# Patient Record
Sex: MALE | Race: BLACK OR AFRICAN AMERICAN
[De-identification: names, ages, dates, MRNs, and addresses within clinical notes are randomized per-mention and may not be internally consistent; named-entity substitution may affect disease eponyms.]

---

## 2017-10-12 ENCOUNTER — HOSPITAL ENCOUNTER (INPATIENT)
Dept: HOSPITAL 74 - YASAS | Age: 52
LOS: 5 days | Discharge: HOME | DRG: 773 | End: 2017-10-17
Attending: INTERNAL MEDICINE | Admitting: INTERNAL MEDICINE
Payer: COMMERCIAL

## 2017-10-12 VITALS — BODY MASS INDEX: 28.3 KG/M2

## 2017-10-12 DIAGNOSIS — M21.372: ICD-10-CM

## 2017-10-12 DIAGNOSIS — R26.89: ICD-10-CM

## 2017-10-12 DIAGNOSIS — K21.9: ICD-10-CM

## 2017-10-12 DIAGNOSIS — F11.23: Primary | ICD-10-CM

## 2017-10-12 DIAGNOSIS — Z99.89: ICD-10-CM

## 2017-10-12 DIAGNOSIS — I10: ICD-10-CM

## 2017-10-12 DIAGNOSIS — F17.213: ICD-10-CM

## 2017-10-12 PROCEDURE — HZ2ZZZZ DETOXIFICATION SERVICES FOR SUBSTANCE ABUSE TREATMENT: ICD-10-PCS | Performed by: INTERNAL MEDICINE

## 2017-10-12 NOTE — HP
COWS





- Scale


Resting Pulse: 0= [place] 80 or Below


Sweatin= Chills/Flushing


Restless Observation: 1= Difficult to Sit Still


Pupil Size: 0= Normal to Room Light


Bone or Joint Aches: 2= Severe Diffuse Aches


Runny Nose/ Eye Tearin= Runny Nose/Eyes


GI Upset > 30mins: 2= Nausea/Diarrhea


Tremor Observation: 2= Slight Tremor Visible


Yawning Observation: 0= None


Anxiety or Irritability: 1=Feels Anxious/Irritable


Goose Flesh Skin: 3=Piloerection


COWS Score: 14





Admission [facility]





- hospitals


Chief Complaint: 


WITHDRAWAL SX


Allergies/Adverse Reactions: 


 Allergies











Allergy/AdvReac Type Severity Reaction Status Date / Time


 


No Known Allergies Allergy   Verified 10/12/17 20:23











History of Present Illness: 


52 YEARS OLD MALE WITH LONG HISTORY OF OPIATE NICOTINE DEPENDENCE HAS 

HYPERTENSION AND LEFT DROP FOOT SINCE  GREAT TOE + 2ND TOE AMPUTEE, DENIES 

MENTAL ILLNESS IS ADMITTED TO DETOX


Exam Limitations: No Limitations





- Ebola screening


Have you traveled outside of the country in the last 21 days: No


Have you had contact with anyone from an Ebola affected area: No


Have you been sick,other than usual withdrawal symptoms: No


Do you have a fever: No





- Review of Systems


Constitutional: Changes in sleep, Weight Stable


EENT: reports: No Symptoms Reported


Respiratory: reports: No Symptoms reported


Cardiac: reports: No Symptoms Reported


GI: reports: Nausea, Poor Fluid Intake, Indigestion, Abdominal cramping


: reports: No Symptoms Reported


Musculoskeletal: reports: Back Pain, Joint Pain, Muscle Pain, Muscle Weakness (

LEFT FOOT), Neck Pain


Integumentary: reports: No Symptoms Reported


Neuro: reports: Tremors


Endocrine: reports: No Symptoms Reported


Hematology: reports: No Symptoms Reported


Psychiatric: reports: Judgement Intact, Mood/Affect Appropiate, Orientated x3


Other Systems: Reviewed and Negative





Patient History





- Patient Medical History


Hx Anemia: No


Hx Asthma: No


Hx Chronic Obstructive Pulmonary Disease (COPD): No


Hx Cancer: No


Hx Cardiac Disorders: No


Hx Congestive Heart Failure: No


Hx Hypertension: No


Hx Hypercholesterolemia: No


Hx Pacemaker: No


HX Cerebrovascular Accident: No


Hx Seizures: No


Hx Dementia: No


Hx Diabetes: No


Hx Gastrointestinal Disorders: No


Hx Liver Disease: No


Hx Genitourinary Disorders: No


Hx Sexually Transmitted Disorders: No


Hx Renal Disease (ESRD): No


Hx Thyroid Disease: No


Hx Human Immunodeficiency Virus (HIV): No


Hx Hepatitis C: No


Hx Depression: No


Hx Suicide Attempt: No


Hx Bipolar Disorder: No


Hx Schizophrenia: No





- Patient Surgical History


Past Surgical History: Yes


Hx Neurologic Surgery: No


Hx Cataract Extraction: No


Hx Cardiac Surgery: No


Hx Lung Surgery: No


Hx Breast Surgery: No


Hx Breast Biopsy: No


Hx Abdominal Surgery: No


Hx Appendectomy: No


Hx Cholecystectomy: No


Hx Genitourinary Surgery: No


Hx Orthopedic Surgery: Yes ( LEFT LEG)


Anesthesia Reaction: No





- PPD History


Previous Implant?: Yes


Documented Results: Negative w/proof


Implanted On Prior [facility] Admission?: No


PPD to be Administered?: Yes





- Smoking Cessation


Smoking history: Current every day smoker


Have you smoked in the past 12 months: Yes


Aproximately how many cigarettes per day: 10


Cigars Per Day: 0


Hx Chewing Tobacco Use: No


Initiated information on smoking cessation: Yes


'Breaking Loose' booklet given: 10/12/17





- Substance & Tx. History


Hx Alcohol Use: No


Hx Substance Use: Yes


Substance Use Type: Cocaine, Heroin


Hx Substance Use Treatment: Yes (2017 [facility])





- Substances Abused


  ** Heroin


Route: Oral


Frequency: Daily


Amount used: 25 BAGS


Age of first use: 26


Date of Last Use: 10/12/17





Family Disease History





- Family Disease History


Family Disease History: CA: Father (), Other: Father





Admission Physical Exam [facility]





- Vital Signs


Vital Signs: 


 Vital Signs - 24 hr











  10/12/17





  17:40


 


Temperature 98.8 F


 


Pulse Rate 64


 


Respiratory 18





Rate 


 


Blood Pressure 150/85














- Physical


General Appearance: Yes: Nourished, Appropriately Dressed, Mild Distress, 

Tremorous, Irritable, Sweating, Anxious


HEENTM: Yes: Hearing grossly Normal, Normal ENT Inspection, Normocephalic, 

Normal Voice


Respiratory: Yes: Chest Non-Tender, Lungs Clear, Normal Breath Sounds, No 

Respiratory Distress, No Accessory Muscle Use


Neck: Yes: Supple, Trachea in good position


Breast: Yes: Breasts Symetrical


Cardiology: Yes: Regular Rhythm, Regular Rate, S1, S2


Abdominal: Yes: Non Tender, Soft, Decreased BS


Genitourinary: Yes: Within Normal Limits


Back: Yes: Normal Inspection


Musculoskeletal: Yes: Gait Steady (CANE), Muscle Pain (LEFT LEG)


Extremities: Yes: Non-Tender, Tremors, Other (LET FOOT GREAT + 2ND TOE AMPUTEE)


Neurological: Yes: Fully Oriented, Alert, Normal Mood/Affect, Normal Response


Integumentary: Yes: Warm


Lymphatic: Yes: Within Normal Limits





- Diagnostic


(1) Opioid dependence with withdrawal


Current Visit: Yes   Status: Acute





(2) Hypertension


Current Visit: Yes   Status: Chronic   Qualifiers: 


     Hypertension type: essential hypertension        Qualified Code(s): I10 - 

Essential (primary) hypertension; I10 - Essential (primary) hypertension; I10 - 

Essential (primary) hypertension  





(3) Use of cane as ambulatory aid


Current Visit: Yes   Status: Chronic





(4) GERD (gastroesophageal reflux disease)


Current Visit: Yes   Status: Chronic   Qualifiers: 


     Esophagitis presence: without esophagitis        Qualified Code(s): K21.9 

- Gastro-esophageal reflux disease without esophagitis; K21.9 - Gastro-

esophageal reflux disease without esophagitis; K21.9 - Gastro-esophageal reflux 

disease without esophagitis  





(5) Nicotine dependence


Current Visit: Yes   Status: Acute   Qualifiers: 


     Nicotine product type: cigarettes     Substance use status: in withdrawal 

       Qualified Code(s): F17.213 - Nicotine dependence, cigarettes, with 

withdrawal; F17.213 - Nicotine dependence, cigarettes, with withdrawal  





(6) Left foot drop


Current Visit: Yes   Status: Chronic








Cleared for Admission S





- Detox or Rehab


[facility] Level of Care: Medically Managed


Detox Regimen/Protocol: Methadone





[facility] Breath Alcohol Content


Breath Alcohol Content: 0





Urine Drug Screen





- Results


Drug Screen Negative: No


Urine Drug Screen Results: NICOLE-Cocaine, OPI-Opiates, MTD-Methadone

## 2017-10-13 LAB
ALBUMIN SERPL-MCNC: 3 G/DL (ref 3.4–5)
ALP SERPL-CCNC: 342 U/L (ref 45–117)
ALT SERPL-CCNC: 211 U/L (ref 12–78)
ANION GAP SERPL CALC-SCNC: 8 MMOL/L (ref 8–16)
APPEARANCE UR: (no result)
AST SERPL-CCNC: 89 U/L (ref 15–37)
BILIRUB SERPL-MCNC: 1.5 MG/DL (ref 0.2–1)
BILIRUB UR STRIP.AUTO-MCNC: 4 MG/DL
CALCIUM SERPL-MCNC: 8.4 MG/DL (ref 8.5–10.1)
CO2 SERPL-SCNC: 27 MMOL/L (ref 21–32)
COLOR UR: (no result)
CREAT SERPL-MCNC: 1 MG/DL (ref 0.7–1.3)
DEPRECATED RDW RBC AUTO: 14.8 % (ref 11.9–15.9)
GLUCOSE SERPL-MCNC: 83 MG/DL (ref 74–106)
KETONES UR QL STRIP: NEGATIVE
MCH RBC QN AUTO: 25.6 PG (ref 25.7–33.7)
MCHC RBC AUTO-ENTMCNC: 31.7 G/DL (ref 32–35.9)
MCV RBC: 80.9 FL (ref 80–96)
MUCOUS THREADS URNS QL MICRO: (no result)
NITRITE UR QL STRIP: NEGATIVE
PH UR: 5 [PH] (ref 5–8)
PLATELET # BLD AUTO: 295 K/MM3 (ref 134–434)
PMV BLD: 7.6 FL (ref 7.5–11.1)
PROT SERPL-MCNC: 6.4 G/DL (ref 6.4–8.2)
PROT UR QL STRIP: (no result)
PROT UR QL STRIP: NEGATIVE
RBC # BLD AUTO: 6 /HPF (ref 0–3)
RBC # UR STRIP: (no result) /UL
SP GR UR: >= 1.03 (ref 1–1.02)
UROBILINOGEN UR STRIP-MCNC: (no result) MG/DL (ref 0.2–1)
WBC # BLD AUTO: 8.4 K/MM3 (ref 4–10)

## 2017-10-13 RX ADMIN — AMLODIPINE BESYLATE SCH MG: 10 TABLET ORAL at 11:04

## 2017-10-13 RX ADMIN — RANITIDINE SCH MG: 150 TABLET ORAL at 11:04

## 2017-10-13 RX ADMIN — NICOTINE SCH: 14 PATCH, EXTENDED RELEASE TRANSDERMAL at 11:05

## 2017-10-13 RX ADMIN — HYDROCHLOROTHIAZIDE SCH MG: 25 TABLET ORAL at 11:06

## 2017-10-13 RX ADMIN — RANITIDINE SCH MG: 150 TABLET ORAL at 22:19

## 2017-10-13 RX ADMIN — Medication SCH MG: at 22:19

## 2017-10-13 RX ADMIN — Medication SCH TAB: at 11:04

## 2017-10-13 NOTE — EKG
Test Reason : 

Blood Pressure : ***/*** mmHG

Vent. Rate : 061 BPM     Atrial Rate : 061 BPM

   P-R Int : 164 ms          QRS Dur : 090 ms

    QT Int : 408 ms       P-R-T Axes : 047 043 -10 degrees

   QTc Int : 410 ms

 

NORMAL SINUS RHYTHM

NONSPECIFIC ST ABNORMALITY

NO PREVIOUS ECGS AVAILABLE

Confirmed by MATT NAVARRETE MD ([id number]) on 10/13/2017 9:25:19 AM

 

Referred By: MADIE SEXTON           Confirmed By:MATT NAVARRETE MD

## 2017-10-13 NOTE — PN
[facility] COWS





- Scale


Resting Pulse: 0= [place] 80 or Below


Sweatin= Chills/Flushing


Restless Observation: 3= Extraneous Movement


Pupil Size: 1= Pupils >than Normal


Bone or Joint Aches: 2= Severe Diffuse Aches


Runny Nose/ Eye Tearin= Runny Nose/Eyes


GI Upset > 30mins: 3= Vomiting/Diarrhea


Tremor Observation of Outstretched Hands: 2= Slight Tremor Visible


Yawning Observation: 1= 1-2x During Session


Anxiety or Irritability: 2=Irritable/Anxious


Goose Flesh Skin: 0=Smooth Skin


COWS Score: 17





[facility] Progress Note (SOAP)


Subjective: 


alert,irritable,anxious,interrupted sleep,tremor,pain in the body,back


Objective: 





10/13/17 11:49


 Vital Signs











Temperature  98.1 F   10/13/17 09:45


 


Pulse Rate  72   10/13/17 09:45


 


Respiratory Rate  18   10/13/17 09:45


 


Blood Pressure  157/103   10/13/17 09:45


 


O2 Sat by Pulse Oximetry (%)      








ekg nsr,normal ecg


 Laboratory Last Values











WBC  8.4 K/mm3 (4.0-10.0)   10/13/17  07:00    


 


RBC  4.96 M/mm3 (4.00-5.60)   10/13/17  07:00    


 


Hgb  12.7 GM/dL (11.7-16.9)   10/13/17  07:00    


 


Hct  40.1 % (35.4-49)   10/13/17  07:00    


 


MCV  80.9 fl (80-96)   10/13/17  07:00    


 


MCH  25.6 pg (25.7-33.7)  L  10/13/17  07:00    


 


MCHC  31.7 g/dl (32.0-35.9)  L  10/13/17  07:00    


 


RDW  14.8 % (11.9-15.9)   10/13/17  07:00    


 


Plt Count  295 K/MM3 (134-434)   10/13/17  07:00    


 


MPV  7.6 fl (7.5-11.1)   10/13/17  07:00    


 


Sodium  143 mmol/L (136-145)   10/13/17  07:00    


 


Potassium  3.8 mmol/L (3.5-5.1)   10/13/17  07:00    


 


Chloride  108 mmol/L ()  H  10/13/17  07:00    


 


Carbon Dioxide  27 mmol/L (21-32)   10/13/17  07:00    


 


Anion Gap  8  (8-16)   10/13/17  07:00    


 


BUN  12 mg/dL (7-18)   10/13/17  07:00    


 


Creatinine  1.0 mg/dL (0.7-1.3)   10/13/17  07:00    


 


Creat Clearance w eGFR  > 60  (>60)   10/13/17  07:00    


 


Random Glucose  83 mg/dL ()   10/13/17  07:00    


 


Calcium  8.4 mg/dL (8.5-10.1)  L  10/13/17  07:00    


 


Total Bilirubin  1.5 mg/dL (0.2-1.0)  H  10/13/17  07:00    


 


AST  89 U/L (15-37)  H  10/13/17  07:00    


 


ALT  211 U/L (12-78)  H  10/13/17  07:00    


 


Alkaline Phosphatase  342 U/L ()  H  10/13/17  07:00    


 


Total Protein  6.4 g/dl (6.4-8.2)   10/13/17  07:00    


 


Albumin  3.0 g/dl (3.4-5.0)  L  10/13/17  07:00    


 


Urine Color  Missy   10/12/17  22:20    


 


Urine Appearance  Slcloudy   10/12/17  22:20    


 


Urine pH  5.0  (5.0-8.0)   10/12/17  22:20    


 


Ur Specific Gravity  >= 1.030  (1.005-1.025)  H  10/12/17  22:20    


 


Urine Protein  1+  (NEGATIVE)  H  10/12/17  22:20    


 


Urine Glucose (UA)  Negative  (NEGATIVE)   10/12/17  22:20    


 


Urine Ketones  Negative  (NEGATIVE)   10/12/17  22:20    


 


Urine Blood  1+  (NEGATIVE)  H  10/12/17  22:20    


 


Urine Nitrite  Negative  (NEGATIVE)   10/12/17  22:20    


 


Urine Bilirubin  4.0  (NEGATIVE)   10/12/17  22:20    


 


Urine Urobilinogen  4.0 e.u/dl mg/dL (0.2-1.0)   10/12/17  22:20    


 


Ur Leukocyte Esterase  Negative  (NEGATIVE)   10/12/17  22:20    


 


Urine RBC  6 /hpf (0-3)   10/12/17  22:20    


 


Ur Epithelial Cells  Rare /hpf (FEW)   10/12/17  22:20    


 


Urine Mucus  Many   10/12/17  22:20    











Assessment: 





10/13/17 11:50


withdrawal symptom


Plan: 


continue detox,d/c tylenol for elevation of ast,alt,alk phosphatase repeat amp,

inr in am

## 2017-10-14 LAB
ALBUMIN SERPL-MCNC: 3 G/DL (ref 3.4–5)
ALP SERPL-CCNC: 302 U/L (ref 45–117)
ALT SERPL-CCNC: 167 U/L (ref 12–78)
ANION GAP SERPL CALC-SCNC: 6 MMOL/L (ref 8–16)
AST SERPL-CCNC: 50 U/L (ref 15–37)
BILIRUB SERPL-MCNC: 1.2 MG/DL (ref 0.2–1)
CALCIUM SERPL-MCNC: 8.8 MG/DL (ref 8.5–10.1)
CO2 SERPL-SCNC: 32 MMOL/L (ref 21–32)
CREAT SERPL-MCNC: 0.9 MG/DL (ref 0.7–1.3)
GLUCOSE SERPL-MCNC: 84 MG/DL (ref 74–106)
INR BLD: 0.96 (ref 0.82–1.09)
PROT SERPL-MCNC: 6.5 G/DL (ref 6.4–8.2)
PT PNL PPP: 10.8 SEC (ref 9.98–11.88)

## 2017-10-14 RX ADMIN — HYDROCHLOROTHIAZIDE SCH MG: 25 TABLET ORAL at 10:52

## 2017-10-14 RX ADMIN — RANITIDINE SCH MG: 150 TABLET ORAL at 22:47

## 2017-10-14 RX ADMIN — RANITIDINE SCH MG: 150 TABLET ORAL at 10:52

## 2017-10-14 RX ADMIN — Medication SCH TAB: at 10:51

## 2017-10-14 RX ADMIN — AMLODIPINE BESYLATE SCH MG: 10 TABLET ORAL at 10:52

## 2017-10-14 RX ADMIN — Medication SCH MG: at 22:47

## 2017-10-14 RX ADMIN — NICOTINE SCH MG: 14 PATCH, EXTENDED RELEASE TRANSDERMAL at 10:52

## 2017-10-14 NOTE — PN
[facility] COWS





- Scale


Resting Pulse: 0= [place] 80 or Below


Sweatin= Chills/Flushing


Restless Observation: 3= Extraneous Movement


Pupil Size: 1= Pupils >than Normal


Bone or Joint Aches: 2= Severe Diffuse Aches


Runny Nose/ Eye Tearin= Runny Nose/Eyes


GI Upset > 30mins: 2= Nausea/Diarrhea


Tremor Observation of Outstretched Hands: 2= Slight Tremor Visible


Yawning Observation: 1= 1-2x During Session


Anxiety or Irritability: 2=Irritable/Anxious


Goose Flesh Skin: 0=Smooth Skin


COWS Score: 16





[facility] Progress Note (SOAP)


Subjective: 


alert,irritable,anxious,interrupted sleep,pain in the body and back


Objective: 





10/14/17 14:14


 Vital Signs











Temperature  98.6 F   10/14/17 13:43


 


Pulse Rate  90   10/14/17 13:43


 


Respiratory Rate  20   10/14/17 13:43


 


Blood Pressure  106/87   10/14/17 13:43


 


O2 Sat by Pulse Oximetry (%)      








 Laboratory Last Values











WBC  8.4 K/mm3 (4.0-10.0)   10/13/17  07:00    


 


RBC  4.96 M/mm3 (4.00-5.60)   10/13/17  07:00    


 


Hgb  12.7 GM/dL (11.7-16.9)   10/13/17  07:00    


 


Hct  40.1 % (35.4-49)   10/13/17  07:00    


 


MCV  80.9 fl (80-96)   10/13/17  07:00    


 


MCH  25.6 pg (25.7-33.7)  L  10/13/17  07:00    


 


MCHC  31.7 g/dl (32.0-35.9)  L  10/13/17  07:00    


 


RDW  14.8 % (11.9-15.9)   10/13/17  07:00    


 


Plt Count  295 K/MM3 (134-434)   10/13/17  07:00    


 


MPV  7.6 fl (7.5-11.1)   10/13/17  07:00    


 


PT with INR  10.80 SEC (9.98-11.88)   10/14/17  08:30    


 


INR  0.96  (0.82-1.09)   10/14/17  08:30    


 


Sodium  143 mmol/L (136-145)   10/14/17  08:00    


 


Potassium  3.7 mmol/L (3.5-5.1)   10/14/17  08:00    


 


Chloride  105 mmol/L ()   10/14/17  08:00    


 


Carbon Dioxide  32 mmol/L (21-32)   10/14/17  08:00    


 


Anion Gap  6  (8-16)  L  10/14/17  08:00    


 


BUN  17 mg/dL (7-18)  D 10/14/17  08:00    


 


Creatinine  0.9 mg/dL (0.7-1.3)   10/14/17  08:00    


 


Creat Clearance w eGFR  > 60  (>60)   10/14/17  08:00    


 


Random Glucose  84 mg/dL ()   10/14/17  08:00    


 


Calcium  8.8 mg/dL (8.5-10.1)   10/14/17  08:00    


 


Total Bilirubin  1.2 mg/dL (0.2-1.0)  H  10/14/17  08:00    


 


AST  50 U/L (15-37)  H D 10/14/17  08:00    


 


ALT  167 U/L (12-78)  H D 10/14/17  08:00    


 


Alkaline Phosphatase  302 U/L ()  H  10/14/17  08:00    


 


Total Protein  6.5 g/dl (6.4-8.2)   10/14/17  08:00    


 


Albumin  3.0 g/dl (3.4-5.0)  L  10/14/17  08:00    


 


Urine Color  Missy   10/12/17  22:20    


 


Urine Appearance  Slcloudy   10/12/17  22:20    


 


Urine pH  5.0  (5.0-8.0)   10/12/17  22:20    


 


Ur Specific Gravity  >= 1.030  (1.005-1.025)  H  10/12/17  22:20    


 


Urine Protein  1+  (NEGATIVE)  H  10/12/17  22:20    


 


Urine Glucose (UA)  Negative  (NEGATIVE)   10/12/17  22:20    


 


Urine Ketones  Negative  (NEGATIVE)   10/12/17  22:20    


 


Urine Blood  1+  (NEGATIVE)  H  10/12/17  22:20    


 


Urine Nitrite  Negative  (NEGATIVE)   10/12/17  22:20    


 


Urine Bilirubin  4.0  (NEGATIVE)   10/12/17  22:20    


 


Urine Urobilinogen  4.0 e.u/dl mg/dL (0.2-1.0)   10/12/17  22:20    


 


Ur Leukocyte Esterase  Negative  (NEGATIVE)   10/12/17  22:20    


 


Urine RBC  6 /hpf (0-3)   10/12/17  22:20    


 


Ur Epithelial Cells  Rare /hpf (FEW)   10/12/17  22:20    


 


Urine Mucus  Many   10/12/17  22:20    


 


RPR Titer  Nonreactive  (NONREACTIVE)   10/13/17  07:00    











Assessment: 





10/14/17 14:15


withdrawal symptom


Plan: 


continue detox

## 2017-10-15 RX ADMIN — Medication SCH MG: at 22:54

## 2017-10-15 RX ADMIN — Medication SCH TAB: at 11:13

## 2017-10-15 RX ADMIN — RANITIDINE SCH MG: 150 TABLET ORAL at 11:13

## 2017-10-15 RX ADMIN — RANITIDINE SCH MG: 150 TABLET ORAL at 22:54

## 2017-10-15 RX ADMIN — NICOTINE SCH: 14 PATCH, EXTENDED RELEASE TRANSDERMAL at 11:14

## 2017-10-15 RX ADMIN — Medication SCH APPLIC: at 22:56

## 2017-10-15 RX ADMIN — HYDROCHLOROTHIAZIDE SCH MG: 25 TABLET ORAL at 11:13

## 2017-10-15 RX ADMIN — AMLODIPINE BESYLATE SCH MG: 10 TABLET ORAL at 11:13

## 2017-10-15 NOTE — PN
[facility] Progress Note (SOAP)


Subjective: 


ALERT,IRRITABLE,ANXIOUS,INTERRUPTED SLEEP,PAIN IN THE BODY,


SENSITIVE SKIN REQUESTING AVEENO SOAP


Objective: 





10/15/17 13:42


 Vital Signs











Temperature  97.3 F L  10/15/17 06:55


 


Pulse Rate  86   10/15/17 06:55


 


Respiratory Rate  18   10/15/17 06:55


 


Blood Pressure  147/87   10/15/17 06:55


 


O2 Sat by Pulse Oximetry (%)      











Assessment: 





10/15/17 13:42


WITHDRAWAL SYMPTOM


Plan: 


CONTINUE DETOX

## 2017-10-16 RX ADMIN — NICOTINE SCH: 14 PATCH, EXTENDED RELEASE TRANSDERMAL at 10:52

## 2017-10-16 RX ADMIN — Medication SCH APPLIC: at 10:52

## 2017-10-16 RX ADMIN — Medication SCH MG: at 22:08

## 2017-10-16 RX ADMIN — RANITIDINE SCH MG: 150 TABLET ORAL at 10:49

## 2017-10-16 RX ADMIN — Medication SCH TAB: at 10:49

## 2017-10-16 RX ADMIN — HYDROCHLOROTHIAZIDE SCH MG: 25 TABLET ORAL at 10:49

## 2017-10-16 RX ADMIN — Medication SCH: at 22:28

## 2017-10-16 RX ADMIN — AMLODIPINE BESYLATE SCH MG: 10 TABLET ORAL at 10:49

## 2017-10-16 RX ADMIN — RANITIDINE SCH MG: 150 TABLET ORAL at 22:08

## 2017-10-16 NOTE — PN
[facility] Progress Note (SOAP)


Subjective: 


alert,irritable,anxious,interrupted sleep


Objective: 





10/16/17 12:32


 Vital Signs











Temperature  98.2 F   10/16/17 11:10


 


Pulse Rate  86   10/16/17 11:10


 


Respiratory Rate  18   10/16/17 11:10


 


Blood Pressure  144/90   10/16/17 11:10


 


O2 Sat by Pulse Oximetry (%)      











Assessment: 





10/16/17 12:32


withdrawal symptom


Plan: 


continue detox,discharge in am

## 2017-10-17 VITALS — SYSTOLIC BLOOD PRESSURE: 105 MMHG | TEMPERATURE: 97 F | DIASTOLIC BLOOD PRESSURE: 71 MMHG | HEART RATE: 86 BPM

## 2017-10-17 LAB — WBC # UR AUTO: 6 /HPF (ref 3–5)

## 2017-10-17 RX ADMIN — HYDROCHLOROTHIAZIDE SCH MG: 25 TABLET ORAL at 10:29

## 2017-10-17 RX ADMIN — Medication SCH TAB: at 10:29

## 2017-10-17 RX ADMIN — RANITIDINE SCH MG: 150 TABLET ORAL at 10:29

## 2017-10-17 RX ADMIN — AMLODIPINE BESYLATE SCH MG: 10 TABLET ORAL at 10:29

## 2017-10-17 RX ADMIN — NICOTINE SCH: 14 PATCH, EXTENDED RELEASE TRANSDERMAL at 10:29

## 2017-10-17 NOTE — DS
[facility] Detox Discharge Summary


Admission Date: 


10/12/17





Discharge Date: 10/17/17





- History


Present History: Opioid Dependence


Additional Comments: 


follow up with after care program as arrangement


Pertinent Past History: 


hypertension


gerd


nicotine dependence


left foot drop


use cane as ambulatory aid





- Physical Exam Results


Vital Signs: 


 Vital Signs











Temperature  96.7 F L  10/17/17 06:45


 


Pulse Rate  74   10/17/17 06:45


 


Respiratory Rate  16   10/17/17 06:45


 


Blood Pressure  104/76   10/17/17 06:45


 


O2 Sat by Pulse Oximetry (%)      











Pertinent Admission Physical Exam Findings: 


withdrawal symptom





- Treatment


Hospital Course: Detox Protocol Followed, Detoxed Safely, Responded well, 

Discharged Condition Good


Patient has Accepted a Rehab Referral to: declined





- Medication


Discharge Medications: 


Ambulatory Orders





Amlodipine Besylate [Norvasc -] 5 mg PO DAILY 10/12/17 


Hydrochlorothiazide [Hctz -] 12.5 mg PO DAILY 10/12/17 











- Diagnosis


(1) Opioid dependence with withdrawal


Current Visit: Yes   Status: Acute





(2) Nicotine dependence


Current Visit: Yes   Status: Acute   Qualifiers: 


     Nicotine product type: cigarettes     Substance use status: in withdrawal 

       Qualified Code(s): F17.213 - Nicotine dependence, cigarettes, with 

withdrawal; F17.213 - Nicotine dependence, cigarettes, with withdrawal  





(3) Hypertension


Current Visit: Yes   Status: Chronic   Qualifiers: 


     Hypertension type: essential hypertension        Qualified Code(s): I10 - 

Essential (primary) hypertension; I10 - Essential (primary) hypertension; I10 - 

Essential (primary) hypertension  





(4) Left foot drop


Current Visit: Yes   Status: Chronic





(5) Use of cane as ambulatory aid


Current Visit: Yes   Status: Chronic





(6) GERD (gastroesophageal reflux disease)


Current Visit: Yes   Status: Chronic   Qualifiers: 


     Esophagitis presence: without esophagitis        Qualified Code(s): K21.9 

- Gastro-esophageal reflux disease without esophagitis; K21.9 - Gastro-

esophageal reflux disease without esophagitis; K21.9 - Gastro-esophageal reflux 

disease without esophagitis  








- AMA


Did Patient Leave Against Medical Advice: No

## 2018-03-27 ENCOUNTER — HOSPITAL ENCOUNTER (INPATIENT)
Dept: HOSPITAL 74 - YASAS | Age: 53
LOS: 4 days | Discharge: HOME | DRG: 773 | End: 2018-03-31
Attending: INTERNAL MEDICINE | Admitting: INTERNAL MEDICINE
Payer: COMMERCIAL

## 2018-03-27 VITALS — BODY MASS INDEX: 27.7 KG/M2

## 2018-03-27 DIAGNOSIS — M21.372: ICD-10-CM

## 2018-03-27 DIAGNOSIS — F17.213: ICD-10-CM

## 2018-03-27 DIAGNOSIS — E87.6: ICD-10-CM

## 2018-03-27 DIAGNOSIS — K21.9: ICD-10-CM

## 2018-03-27 DIAGNOSIS — F11.23: Primary | ICD-10-CM

## 2018-03-27 DIAGNOSIS — I10: ICD-10-CM

## 2018-03-27 LAB
APPEARANCE UR: (no result)
BILIRUB UR STRIP.AUTO-MCNC: NEGATIVE MG/DL
COLOR UR: YELLOW
KETONES UR QL STRIP: NEGATIVE
LEUKOCYTE ESTERASE UR QL STRIP.AUTO: NEGATIVE
MUCOUS THREADS URNS QL MICRO: (no result)
NITRITE UR QL STRIP: NEGATIVE
PH UR: 5 [PH] (ref 5–8)
PROT UR QL STRIP: (no result)
PROT UR QL STRIP: NEGATIVE
RBC # UR STRIP: NEGATIVE /UL
SP GR UR: 1.03 (ref 1–1.03)
UROBILINOGEN UR STRIP-MCNC: (no result) MG/DL (ref 0.2–1)
YEAST #/AREA URNS HPF: (no result) /[HPF]

## 2018-03-27 PROCEDURE — HZ2ZZZZ DETOXIFICATION SERVICES FOR SUBSTANCE ABUSE TREATMENT: ICD-10-PCS | Performed by: SURGERY

## 2018-03-27 RX ADMIN — Medication SCH MG: at 22:36

## 2018-03-28 LAB
ALBUMIN SERPL-MCNC: 3.4 G/DL (ref 3.4–5)
ALP SERPL-CCNC: 161 U/L (ref 45–117)
ALT SERPL-CCNC: 22 U/L (ref 12–78)
ANION GAP SERPL CALC-SCNC: 11 MMOL/L (ref 8–16)
APPEARANCE UR: (no result)
AST SERPL-CCNC: 13 U/L (ref 15–37)
BACTERIA #/AREA URNS HPF: (no result) /HPF
BILIRUB SERPL-MCNC: 0.4 MG/DL (ref 0.2–1)
BILIRUB UR STRIP.AUTO-MCNC: 2 MG/DL
BUN SERPL-MCNC: 16 MG/DL (ref 7–18)
CALCIUM SERPL-MCNC: 8.5 MG/DL (ref 8.5–10.1)
CHLORIDE SERPL-SCNC: 108 MMOL/L (ref 98–107)
CO2 SERPL-SCNC: 22 MMOL/L (ref 21–32)
COLOR UR: (no result)
CREAT SERPL-MCNC: 1.1 MG/DL (ref 0.7–1.3)
DEPRECATED RDW RBC AUTO: 14 % (ref 11.9–15.9)
EPITH CASTS URNS QL MICRO: (no result) /HPF
GLUCOSE SERPL-MCNC: 98 MG/DL (ref 74–106)
HCT VFR BLD CALC: 41.6 % (ref 35.4–49)
HGB BLD-MCNC: 13.3 GM/DL (ref 11.7–16.9)
KETONES UR QL STRIP: (no result)
LEUKOCYTE ESTERASE UR QL STRIP.AUTO: NEGATIVE
MCH RBC QN AUTO: 26 PG (ref 25.7–33.7)
MCHC RBC AUTO-ENTMCNC: 31.9 G/DL (ref 32–35.9)
MCV RBC: 81.3 FL (ref 80–96)
MUCOUS THREADS URNS QL MICRO: (no result)
NITRITE UR QL STRIP: NEGATIVE
PH UR: 5 [PH] (ref 5–8)
PLATELET # BLD AUTO: 348 K/MM3 (ref 134–434)
PMV BLD: 7.6 FL (ref 7.5–11.1)
POTASSIUM SERPLBLD-SCNC: 3.4 MMOL/L (ref 3.5–5.1)
PROT SERPL-MCNC: 6.9 G/DL (ref 6.4–8.2)
PROT UR QL STRIP: (no result)
PROT UR QL STRIP: NEGATIVE
RBC # BLD AUTO: 5.12 M/MM3 (ref 4–5.6)
RBC # UR STRIP: NEGATIVE /UL
SODIUM SERPL-SCNC: 141 MMOL/L (ref 136–145)
SP GR UR: 1.04 (ref 1–1.03)
UROBILINOGEN UR STRIP-MCNC: 2 MG/DL (ref 0.2–1)
WBC # BLD AUTO: 9.8 K/MM3 (ref 4–10)

## 2018-03-28 RX ADMIN — Medication SCH TAB: at 11:05

## 2018-03-28 RX ADMIN — AMLODIPINE BESYLATE SCH MG: 5 TABLET ORAL at 12:33

## 2018-03-28 RX ADMIN — Medication SCH MG: at 22:10

## 2018-03-28 RX ADMIN — HYDROCHLOROTHIAZIDE SCH MG: 12.5 CAPSULE ORAL at 12:32

## 2018-03-28 NOTE — PN
[facility] COWS





- Scale


Resting Pulse: 1= [place] 


Sweatin= Chills/Flushing


Restless Observation: 3= Extraneous Movement


Pupil Size: 2= Moderately Dilated


Bone or Joint Aches: 2= Severe Diffuse Aches


Runny Nose/ Eye Tearin= Runny Nose/Eyes


GI Upset > 30mins: 2= Nausea/Diarrhea


Tremor Observation of Outstretched Hands: 2= Slight Tremor Visible


Yawning Observation: 1= 1-2x During Session


Anxiety or Irritability: 2=Irritable/Anxious


Goose Flesh Skin: 0=Smooth Skin


COWS Score: 18





[facility] Progress Note (SOAP)


Subjective: 





ALERT,IRRITABLE,ANXIOUS,INTERRUPTED SLEEP,PAIN IN THE BODY,JOINT AND BACK,TREMOR


Objective: 





18 10:39


 Vital Signs











Temperature  97.9 F   18 10:24


 


Pulse Rate  83   18 10:24


 


Respiratory Rate  20   18 10:24


 


Blood Pressure  156/82   18 10:24


 


O2 Sat by Pulse Oximetry (%)      











18 10:39


EKG NSR,NORMAL ECG


 Laboratory Last Values











WBC  9.8 K/mm3 (4.0-10.0)   18  07:30    


 


RBC  5.12 M/mm3 (4.00-5.60)   18  07:30    


 


Hgb  13.3 GM/dL (11.7-16.9)   18  07:30    


 


Hct  41.6 % (35.4-49)   18  07:30    


 


MCV  81.3 fl (80-96)   18  07:30    


 


MCH  26.0 pg (25.7-33.7)   18  07:30    


 


MCHC  31.9 g/dl (32.0-35.9)  L  18  07:30    


 


RDW  14.0 % (11.9-15.9)   18  07:30    


 


Plt Count  348 K/MM3 (134-434)   18  07:30    


 


MPV  7.6 fl (7.5-11.1)   18  07:30    


 


Urine Color  Yellow   18  20:00    


 


Urine Appearance  Turbid   18  20:00    


 


Urine pH  5.0  (5.0-8.0)   18  20:00    


 


Ur Specific Gravity  1.033  (1.001-1.035)   18  20:00    


 


Urine Protein  1+  (NEGATIVE)  H  18  20:00    


 


Urine Glucose (UA)  Negative  (NEGATIVE)   18  20:00    


 


Urine Ketones  Negative  (NEGATIVE)   18  20:00    


 


Urine Blood  Negative  (NEGATIVE)   18  20:00    


 


Urine Nitrite  Negative  (NEGATIVE)   18  20:00    


 


Urine Bilirubin  Negative  (<2.0 mg/dL)   18  20:00    


 


Urine Urobilinogen  4.0 e.u/dl mg/dL (0.2-1.0)   18  20:00    


 


Ur Leukocyte Esterase  Negative  (NEGATIVE)   18  20:00    


 


Urine WBC (Auto)  157 /hpf (3-5)   18  20:00    


 


Urine RBC (Auto)  None /hpf (0-3)   18  20:00    


 


Urine Mucus  Many   18  20:00    


 


Urine Yeast  Few   18  20:00    








LABS PENDING


Assessment: 





18 10:40


WITHDRAWAL SYMPTOM


Plan: 





CONTINUE DETOX,REPEAT UA AND URINE FOR C/S R/O UTI,ENCOURAGE ORAL FLUID

## 2018-03-28 NOTE — EKG
Test Reason : 

Blood Pressure : ***/*** mmHG

Vent. Rate : 074 BPM     Atrial Rate : 074 BPM

   P-R Int : 168 ms          QRS Dur : 088 ms

    QT Int : 402 ms       P-R-T Axes : 050 046 013 degrees

   QTc Int : 446 ms

 

NORMAL SINUS RHYTHM

NORMAL ECG

WHEN COMPARED WITH ECG OF 20-NOV-2017 18:02,

NO SIGNIFICANT CHANGE WAS FOUND

Confirmed by YOSEPH THOMAS MD ([id number]) on 3/28/2018 10:53:06 AM

 

Referred By:             Confirmed By:YOSEPH THOMAS MD

## 2018-03-29 RX ADMIN — Medication PRN MG: at 22:24

## 2018-03-29 RX ADMIN — Medication SCH MG: at 22:23

## 2018-03-29 RX ADMIN — Medication SCH TAB: at 10:22

## 2018-03-29 RX ADMIN — POTASSIUM CHLORIDE SCH MEQ: 1500 TABLET, EXTENDED RELEASE ORAL at 12:17

## 2018-03-29 RX ADMIN — HYDROCHLOROTHIAZIDE SCH MG: 12.5 CAPSULE ORAL at 10:22

## 2018-03-29 RX ADMIN — AMLODIPINE BESYLATE SCH MG: 5 TABLET ORAL at 10:22

## 2018-03-29 NOTE — PN
[facility] COWS





- Scale


Resting Pulse: 1= [place] 


Sweatin= Chills/Flushing


Restless Observation: 3= Extraneous Movement


Pupil Size: 1= Pupils >than Normal


Bone or Joint Aches: 2= Severe Diffuse Aches


Runny Nose/ Eye Tearin= Runny Nose/Eyes


GI Upset > 30mins: 2= Nausea/Diarrhea


Tremor Observation of Outstretched Hands: 2= Slight Tremor Visible


Yawning Observation: 1= 1-2x During Session


Anxiety or Irritability: 2=Irritable/Anxious


Goose Flesh Skin: 0=Smooth Skin


COWS Score: 17





[facility]HS Progress Note (SOAP)


Subjective: 





ALERT,IRRITABLE,ANXIOUS,INTERRUPTED SLEEP,TREMOR,PAIN IN THE BODY AND BACK,

TREMOR


Objective: 





18 11:59


 Vital Signs











Temperature  97.2 F L  18 11:06


 


Pulse Rate  83   18 11:06


 


Respiratory Rate  20   18 11:06


 


Blood Pressure  124/76   18 11:06


 


O2 Sat by Pulse Oximetry (%)      











18 12:00


 Laboratory Last Values











WBC  9.8 K/mm3 (4.0-10.0)   18  07:30    


 


RBC  5.12 M/mm3 (4.00-5.60)   18  07:30    


 


Hgb  13.3 GM/dL (11.7-16.9)   18  07:30    


 


Hct  41.6 % (35.4-49)   18  07:30    


 


MCV  81.3 fl (80-96)   18  07:30    


 


MCH  26.0 pg (25.7-33.7)   18  07:30    


 


MCHC  31.9 g/dl (32.0-35.9)  L  18  07:30    


 


RDW  14.0 % (11.9-15.9)   18  07:30    


 


Plt Count  348 K/MM3 (134-434)   18  07:30    


 


MPV  7.6 fl (7.5-11.1)   18  07:30    


 


Sodium  141 mmol/L (136-145)   18  07:30    


 


Potassium  3.4 mmol/L (3.5-5.1)  L  18  07:30    


 


Chloride  108 mmol/L ()  H  18  07:30    


 


Carbon Dioxide  22 mmol/L (21-32)   18  07:30    


 


Anion Gap  11  (8-16)   18  07:30    


 


BUN  16 mg/dL (7-18)   18  07:30    


 


Creatinine  1.1 mg/dL (0.7-1.3)   18  07:30    


 


Creat Clearance w eGFR  > 60  (>60)   18  07:30    


 


Random Glucose  98 mg/dL ()  D 18  07:30    


 


Calcium  8.5 mg/dL (8.5-10.1)   18  07:30    


 


Total Bilirubin  0.4 mg/dL (0.2-1.0)   18  07:30    


 


AST  13 U/L (15-37)  L  18  07:30    


 


ALT  22 U/L (12-78)   18  07:30    


 


Alkaline Phosphatase  161 U/L ()  H D 18  07:30    


 


Total Protein  6.9 g/dl (6.4-8.2)   18  07:30    


 


Albumin  3.4 g/dl (3.4-5.0)   18  07:30    


 


Urine Color  Missy   18  13:40    


 


Urine Appearance  Turbid   18  13:40    


 


Urine pH  5.0  (5.0-8.0)   18  13:40    


 


Ur Specific Gravity  1.038  (1.001-1.035)  H  18  13:40    


 


Urine Protein  1+  (NEGATIVE)  H  18  13:40    


 


Urine Glucose (UA)  Negative  (NEGATIVE)   18  13:40    


 


Urine Ketones  Trace  (NEGATIVE)  H  18  13:40    


 


Urine Blood  Negative  (NEGATIVE)   18  13:40    


 


Urine Nitrite  Negative  (NEGATIVE)   18  13:40    


 


Urine Bilirubin  2.0  (<2.0 mg/dL)   18  13:40    


 


Urine Urobilinogen  2.0 mg/dL (0.2-1.0)   18  13:40    


 


Ur Leukocyte Esterase  Negative  (NEGATIVE)   18  13:40    


 


Urine WBC (Auto)  8 /hpf (3-5)   18  13:40    


 


Urine RBC (Auto)  14 /hpf (0-3)   18  13:40    


 


Ur Epithelial Cells  Rare /HPF (FEW)   18  13:40    


 


Urine Bacteria  Rare /hpf (NONE SEEN)   18  13:40    


 


Urine Mucus  Moderate   18  13:40    


 


Urine Yeast  Few   18  20:00    


 


RPR Titer  Nonreactive  (NONREACTIVE)   18  07:30    











18 12:01


 Laboratory Results - last 24 hr











  18





  13:40


 


Urine Color  Missy


 


Urine Appearance  Turbid


 


Urine pH  5.0


 


Ur Specific Gravity  1.038 H


 


Urine Protein  1+ H


 


Urine Glucose (UA)  Negative


 


Urine Ketones  Trace H


 


Urine Blood  Negative


 


Urine Nitrite  Negative


 


Urine Bilirubin  2.0


 


Urine Urobilinogen  2.0


 


Ur Leukocyte Esterase  Negative


 


Urine WBC (Auto)  8


 


Urine RBC (Auto)  14


 


Ur Epithelial Cells  Rare


 


Urine Bacteria  Rare


 


Urine Mucus  Moderate











Assessment: 





18 12:02


WITHDRAWAL SYMPTOM


Plan: 





CONTINUE DETOX, K IS 3.4 PYPOKALEMIA K DUR 20 MEQ PO DAILY

## 2018-03-30 RX ADMIN — Medication SCH TAB: at 10:56

## 2018-03-30 RX ADMIN — HYDROCHLOROTHIAZIDE SCH MG: 12.5 CAPSULE ORAL at 10:56

## 2018-03-30 RX ADMIN — Medication PRN MG: at 22:32

## 2018-03-30 RX ADMIN — POTASSIUM CHLORIDE SCH MEQ: 1500 TABLET, EXTENDED RELEASE ORAL at 10:56

## 2018-03-30 RX ADMIN — Medication SCH MG: at 22:32

## 2018-03-30 RX ADMIN — AMLODIPINE BESYLATE SCH MG: 5 TABLET ORAL at 10:56

## 2018-03-30 NOTE — PN
[facility] Progress Note (SOAP)


Subjective: 





ALERT,IRRITABLE,ANXIOUS,INTERRUPTED SLEEP,ABDOMINAL PAIN


Objective: 





03/30/18 12:52


 Vital Signs











Temperature  97.9 F   03/30/18 10:00


 


Pulse Rate  92 H  03/30/18 10:00


 


Respiratory Rate  16   03/30/18 10:00


 


Blood Pressure  158/75   03/30/18 10:00


 


O2 Sat by Pulse Oximetry (%)      











Assessment: 





03/30/18 12:52


WITHDRAWAL SYMPTOM


Plan: 





CONTINUE DETOX

## 2018-03-31 VITALS — SYSTOLIC BLOOD PRESSURE: 126 MMHG | TEMPERATURE: 97.3 F | DIASTOLIC BLOOD PRESSURE: 73 MMHG | HEART RATE: 83 BPM

## 2018-03-31 NOTE — PN
[facility] Progress Note (SOAP)


Subjective: 





ALERT,NO COMPLAINT


Objective: 





03/31/18 09:21


 Vital Signs











Temperature  97.2 F L  03/31/18 06:45


 


Pulse Rate  77   03/31/18 06:45


 


Respiratory Rate  18   03/31/18 06:45


 


Blood Pressure  126/90   03/31/18 06:45


 


O2 Sat by Pulse Oximetry (%)      











Assessment: 


NO WITHDRAWAL SYMPTOM


Plan: 





STABLE FOR DISCHARGE TODAY,FOLLOW UP WITH AFTER CARE PROGRAM AS ARRANGEMENT
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
